# Patient Record
Sex: MALE | Race: WHITE | Employment: FULL TIME | URBAN - METROPOLITAN AREA
[De-identification: names, ages, dates, MRNs, and addresses within clinical notes are randomized per-mention and may not be internally consistent; named-entity substitution may affect disease eponyms.]

---

## 2017-06-13 ENCOUNTER — HOSPITAL ENCOUNTER (OUTPATIENT)
Dept: GENERAL RADIOLOGY | Age: 52
Discharge: HOME | End: 2017-06-13
Payer: COMMERCIAL

## 2017-06-13 DIAGNOSIS — S23.29XA: ICD-10-CM

## 2017-06-13 PROCEDURE — 71120 X-RAY EXAM BREASTBONE 2/>VWS: CPT

## 2023-07-07 ENCOUNTER — HOSPITAL ENCOUNTER (OUTPATIENT)
Facility: HOSPITAL | Age: 58
Discharge: HOME | End: 2023-07-07
Payer: COMMERCIAL

## 2023-07-07 DIAGNOSIS — M54.50 LOW BACK PAIN, UNSPECIFIED BACK PAIN LATERALITY, UNSPECIFIED CHRONICITY, UNSPECIFIED WHETHER SCIATICA PRESENT: ICD-10-CM

## 2023-07-07 PROCEDURE — 72110 X-RAY EXAM L-2 SPINE 4/>VWS: CPT

## 2023-12-04 ENCOUNTER — HOSPITAL ENCOUNTER (OUTPATIENT)
Facility: HOSPITAL | Age: 58
Setting detail: SPECIMEN
Discharge: HOME | End: 2023-12-07

## 2023-12-04 LAB
ANION GAP SERPL CALC-SCNC: 7 MMOL/L (ref 3–18)
BUN SERPL-MCNC: 18 MG/DL (ref 7–18)
BUN/CREAT SERPL: 17 (ref 12–20)
CALCIUM SERPL-MCNC: 9.5 MG/DL (ref 8.5–10.1)
CHLORIDE SERPL-SCNC: 106 MMOL/L (ref 100–111)
CO2 SERPL-SCNC: 26 MMOL/L (ref 21–32)
CREAT SERPL-MCNC: 1.06 MG/DL (ref 0.6–1.3)
GLUCOSE SERPL-MCNC: 75 MG/DL (ref 74–99)
POTASSIUM SERPL-SCNC: 4.9 MMOL/L (ref 3.5–5.5)
SODIUM SERPL-SCNC: 139 MMOL/L (ref 136–145)

## 2023-12-04 PROCEDURE — 80048 BASIC METABOLIC PNL TOTAL CA: CPT

## 2025-04-01 RX ORDER — SACUBITRIL AND VALSARTAN 49; 51 MG/1; MG/1
1 TABLET, FILM COATED ORAL 2 TIMES DAILY
COMMUNITY
Start: 2024-11-01

## 2025-04-01 RX ORDER — ATORVASTATIN CALCIUM 80 MG/1
1 TABLET, FILM COATED ORAL
COMMUNITY
Start: 2024-07-09

## 2025-04-01 RX ORDER — ASPIRIN 81 MG/1
81 TABLET, CHEWABLE ORAL DAILY
COMMUNITY
Start: 2024-09-05

## 2025-04-01 RX ORDER — DAPAGLIFLOZIN 10 MG/1
10 TABLET, FILM COATED ORAL DAILY
COMMUNITY
Start: 2024-11-01

## 2025-04-01 NOTE — PROGRESS NOTES
Instructions for your procedure at [facility]      Today's Date: 4/1/2025      Patient's Name: Robert Tellez      Procedure Date: 4/14/2025        Please enter the main entrance of the hospital and check-in at the  located in the lobby.      Do NOT eat or drink anything, including candy, gum, or ice chips after midnight prior to your procedure, unless it is part of your prep.  Brush your teeth before coming to the hospital.You may swish with water, but do not swallow.  No smoking/Vaping/E-Cigarettes 24 hours prior to the day of procedure.  No alcohol 24 hours prior to the day of procedure.  No recreational drugs for one week prior to the day of procedure.  Bring Photo ID, Insurance information, and Co-pay if required on day of procedure.  Bring in pertinent legal documents, such as, Medical Power of , DNR, Advance Directive, etc.  Leave all other valuables, including money/purse, weapons at home.  Remove jewelry, including ALL body piercings, nail polish, acrylic nails, and makeup (including mascara); no lotions, powders, deodorant, and/or perfume/cologne/after shave on the skin.  Glasses and dentures may be worn to the hospital.  They must be removed prior to procedure. Please bring case/container for glasses or dentures.  11. Contacts should not be worn on day of procedure.   12. Call the office ([phone]) if you have symptoms of a cold or illness within 24-48 hours prior to your procedure.   13. AN ADULT (relative or friend 18 years or older) MUST DRIVE YOU HOME AFTER YOUR PROCEDURE.   14. Please make arrangements for a responsible adult (18 years or older) to be with you for 24 hours after your procedure.   15. TWO VISITORS will be allowed in the waiting area during your procedure.       Special Instructions:      Bring list of CURRENT medications.  Follow instructions from the office regarding Bowel Prep, Vitamins, Iron, Blood Thinners, Insulin, Seizure, and

## 2025-04-11 ENCOUNTER — ANESTHESIA EVENT (OUTPATIENT)
Facility: HOSPITAL | Age: 60
End: 2025-04-11
Payer: COMMERCIAL

## 2025-04-14 ENCOUNTER — HOSPITAL ENCOUNTER (OUTPATIENT)
Facility: HOSPITAL | Age: 60
Setting detail: OUTPATIENT SURGERY
Discharge: HOME | End: 2025-04-14
Attending: INTERNAL MEDICINE | Admitting: INTERNAL MEDICINE
Payer: COMMERCIAL

## 2025-04-14 ENCOUNTER — ANESTHESIA (OUTPATIENT)
Facility: HOSPITAL | Age: 60
End: 2025-04-14
Payer: COMMERCIAL

## 2025-04-14 VITALS
WEIGHT: 201 LBS | OXYGEN SATURATION: 96 % | TEMPERATURE: 97.6 F | DIASTOLIC BLOOD PRESSURE: 60 MMHG | RESPIRATION RATE: 18 BRPM | BODY MASS INDEX: 28.05 KG/M2 | HEART RATE: 51 BPM | SYSTOLIC BLOOD PRESSURE: 99 MMHG

## 2025-04-14 PROCEDURE — 2709999900 HC NON-CHARGEABLE SUPPLY: Performed by: INTERNAL MEDICINE

## 2025-04-14 PROCEDURE — 3600007512: Performed by: INTERNAL MEDICINE

## 2025-04-14 PROCEDURE — 3600007502: Performed by: INTERNAL MEDICINE

## 2025-04-14 PROCEDURE — 2580000003 HC RX 258: Performed by: NURSE ANESTHETIST, CERTIFIED REGISTERED

## 2025-04-14 PROCEDURE — 88305 TISSUE EXAM BY PATHOLOGIST: CPT

## 2025-04-14 PROCEDURE — 3700000000 HC ANESTHESIA ATTENDED CARE: Performed by: INTERNAL MEDICINE

## 2025-04-14 PROCEDURE — 6360000002 HC RX W HCPCS: Performed by: ANESTHESIOLOGY

## 2025-04-14 PROCEDURE — 7100000010 HC PHASE II RECOVERY - FIRST 15 MIN: Performed by: INTERNAL MEDICINE

## 2025-04-14 PROCEDURE — 7100000000 HC PACU RECOVERY - FIRST 15 MIN: Performed by: INTERNAL MEDICINE

## 2025-04-14 PROCEDURE — 3700000001 HC ADD 15 MINUTES (ANESTHESIA): Performed by: INTERNAL MEDICINE

## 2025-04-14 RX ORDER — PROPOFOL 10 MG/ML
INJECTION, EMULSION INTRAVENOUS
Status: DISCONTINUED | OUTPATIENT
Start: 2025-04-14 | End: 2025-04-14 | Stop reason: SDUPTHER

## 2025-04-14 RX ORDER — SODIUM CHLORIDE, SODIUM LACTATE, POTASSIUM CHLORIDE, CALCIUM CHLORIDE 600; 310; 30; 20 MG/100ML; MG/100ML; MG/100ML; MG/100ML
INJECTION, SOLUTION INTRAVENOUS CONTINUOUS
Status: DISCONTINUED | OUTPATIENT
Start: 2025-04-14 | End: 2025-04-14 | Stop reason: HOSPADM

## 2025-04-14 RX ORDER — FAMOTIDINE 20 MG/1
20 TABLET, FILM COATED ORAL ONCE
Status: DISCONTINUED | OUTPATIENT
Start: 2025-04-14 | End: 2025-04-14 | Stop reason: HOSPADM

## 2025-04-14 RX ORDER — LIDOCAINE HYDROCHLORIDE 10 MG/ML
1 INJECTION, SOLUTION EPIDURAL; INFILTRATION; INTRACAUDAL; PERINEURAL
Status: DISCONTINUED | OUTPATIENT
Start: 2025-04-14 | End: 2025-04-14 | Stop reason: HOSPADM

## 2025-04-14 RX ADMIN — PROPOFOL 100 MG: 10 INJECTION, EMULSION INTRAVENOUS at 09:33

## 2025-04-14 RX ADMIN — PROPOFOL 30 MG: 10 INJECTION, EMULSION INTRAVENOUS at 09:52

## 2025-04-14 RX ADMIN — PROPOFOL 30 MG: 10 INJECTION, EMULSION INTRAVENOUS at 09:41

## 2025-04-14 RX ADMIN — PROPOFOL 30 MG: 10 INJECTION, EMULSION INTRAVENOUS at 09:55

## 2025-04-14 RX ADMIN — PROPOFOL 30 MG: 10 INJECTION, EMULSION INTRAVENOUS at 09:35

## 2025-04-14 RX ADMIN — PROPOFOL 30 MG: 10 INJECTION, EMULSION INTRAVENOUS at 09:53

## 2025-04-14 RX ADMIN — SODIUM CHLORIDE, SODIUM LACTATE, POTASSIUM CHLORIDE, AND CALCIUM CHLORIDE: 600; 310; 30; 20 INJECTION, SOLUTION INTRAVENOUS at 09:20

## 2025-04-14 RX ADMIN — PROPOFOL 30 MG: 10 INJECTION, EMULSION INTRAVENOUS at 09:47

## 2025-04-14 RX ADMIN — PROPOFOL 30 MG: 10 INJECTION, EMULSION INTRAVENOUS at 09:38

## 2025-04-14 RX ADMIN — PROPOFOL 30 MG: 10 INJECTION, EMULSION INTRAVENOUS at 09:44

## 2025-04-14 RX ADMIN — PROPOFOL 30 MG: 10 INJECTION, EMULSION INTRAVENOUS at 09:50

## 2025-04-14 ASSESSMENT — PAIN - FUNCTIONAL ASSESSMENT
PAIN_FUNCTIONAL_ASSESSMENT: NONE - DENIES PAIN
PAIN_FUNCTIONAL_ASSESSMENT: 0-10

## 2025-04-14 NOTE — H&P
[email or web address]  [phone]      History and Physical    Patient: Robert Tellez MRN: [id number]  SSN: [id number]    YOB: 1965  Age: 59 y.o.  Sex: male      Subjective:      Robert Tellez is a 59 y.o. male who presents for increased risk CRCS. Pt has a personal history of colon polyps. Denies symptoms or concerns. .     Past Medical History:   Diagnosis Date    CAD (coronary artery disease) 09/2023    MI-stented LAD    Hyperlipidemia     Hypertension     DONALD (obstructive sleep apnea)     uses mouth guard     Past Surgical History:   Procedure Laterality Date    CARDIAC CATHETERIZATION  09/2023    COLONOSCOPY        History reviewed. No pertinent family history.  Social History     Tobacco Use    Smoking status: Former     Types: Cigarettes    Smokeless tobacco: Never   Substance Use Topics    Alcohol use: Yes     Alcohol/week: 12.0 standard drinks of alcohol     Types: 12 Cans of beer per week      Prior to Admission medications    Medication Sig Start Date End Date Taking? Authorizing Provider   aspirin 81 MG chewable tablet Take 1 tablet by mouth daily 9/5/24  Yes ProviderMunira MD   atorvastatin (LIPITOR) 80 MG tablet Take 1 tablet by mouth nightly 7/9/24  Yes ProviderMunira MD   dapagliflozin (FARXIGA) 10 MG tablet Take 1 tablet by mouth daily 11/1/24  Yes ProviderMunira MD   sacubitril-valsartan (ENTRESTO) 49-51 MG per tablet Take 1 tablet by mouth 2 times daily 11/1/24  Yes Provider, MD Munira   cyanocobalamin 100 MCG tablet Take 1 tablet by mouth daily    ProviderMunira MD        Allergies   Allergen Reactions    Captopril Anaphylaxis     Had airway compromise, either Captopril or Albumin in hospital, uncertain which caused it       Review of Systems:  A comprehensive review of systems was negative except for that written in the History of Present Illness.    Objective:     There were no vitals filed for this visit.     Physical Exam:  GENERAL: alert,

## 2025-04-14 NOTE — ANESTHESIA PRE PROCEDURE
Department of Anesthesiology  Preprocedure Note       Name:  Robert Tellez   Age:  59 y.o.  :  1965                                          MRN:  [id number]         Date:  2025      Surgeon: Surgeon(s):  Ermelinda Dominguez MD    Procedure: Procedure(s):  COLONOSCOPY    Medications prior to admission:   Prior to Admission medications    Medication Sig Start Date End Date Taking? Authorizing Provider   aspirin 81 MG chewable tablet Take 1 tablet by mouth daily 24  Yes Provider, MD Munira   atorvastatin (LIPITOR) 80 MG tablet Take 1 tablet by mouth nightly 24  Yes Provider, MD Munira   dapagliflozin (FARXIGA) 10 MG tablet Take 1 tablet by mouth daily 24  Yes Provider, MD Munira   sacubitril-valsartan (ENTRESTO) 49-51 MG per tablet Take 1 tablet by mouth 2 times daily 24  Yes Provider, MD Munira   cyanocobalamin 100 MCG tablet Take 1 tablet by mouth daily   Yes Provider, MD Munira       Current medications:    Current Facility-Administered Medications   Medication Dose Route Frequency Provider Last Rate Last Admin   • lidocaine PF 1 % injection 1 mL  1 mL IntraDERmal Once PRN Vijaya Craft APRN - CRNA       • famotidine (PEPCID) tablet 20 mg  20 mg Oral Once Vijaya Craft APRN - CRNA       • lactated ringers infusion   IntraVENous Continuous Vijaya Craft APRN - CRNA 125 mL/hr at 25 0920 New Bag at 25 0920       Allergies:    Allergies   Allergen Reactions   • Captopril Anaphylaxis     Had airway compromise, either Captopril or Albumin in hospital, uncertain which caused it       Problem List:  There is no problem list on file for this patient.      Past Medical History:        Diagnosis Date   • CAD (coronary artery disease) 2023    MI-stented LAD   • Hyperlipidemia    • Hypertension    • DONALD (obstructive sleep apnea)     uses mouth guard       Past Surgical History:        Procedure Laterality Date   • CARDIAC CATHETERIZATION

## 2025-04-14 NOTE — DISCHARGE INSTRUCTIONS
Colonoscopy: What to Expect at Home  Your Recovery  After a colonoscopy, you'll stay at the clinic until you wake up. Then you can go home. But you'll need to arrange for a ride. Your doctor will tell you when you can eat and do your other usual activities.  Your doctor will talk to you about when you'll need your next colonoscopy. Your doctor can help you decide how often you need to be checked. This will depend on the results of your test and your risk for colorectal cancer.  After the test, you may be bloated or have gas pains. You may need to pass gas. If a biopsy was done or a polyp was removed, you may have streaks of blood in your stool (feces) for a few days. Problems such as heavy rectal bleeding may not occur until several weeks after the test. This isn't common. But it can happen after polyps are removed.  This care sheet gives you a general idea about how long it will take for you to recover. But each person recovers at a different pace. Follow the steps below to get better as quickly as possible.  How can you care for yourself at home?  Activity    Rest when you feel tired.     You can do your normal activities when it feels okay to do so.   Diet    Follow your doctor's directions for eating.     Unless your doctor has told you not to, drink plenty of fluids. This helps to replace the fluids that were lost during the colon prep.     Do not drink alcohol.   Medicines    Your doctor will tell you if and when you can restart your medicines. You will also be given instructions about taking any new medicines.     If you take aspirin or some other blood thinner, ask your doctor if and when to start taking it again. Make sure that you understand exactly what your doctor wants you to do.     If polyps were removed or a biopsy was done during the test, your doctor may tell you not to take aspirin or other anti-inflammatory medicines for a few days. These include ibuprofen (Advil, Motrin) and naproxen (Aleve).

## 2025-04-14 NOTE — ANESTHESIA POSTPROCEDURE EVALUATION
Department of Anesthesiology  Postprocedure Note    Patient: Robert Tellez  MRN: [id number]  YOB: 1965  Date of evaluation: 4/14/2025    Procedure Summary       Date: 04/14/25 Room / Location: [facility] ENDO 02 / [facility] ENDOSCOPY    Anesthesia Start: 0928 Anesthesia Stop: 1003    Procedure: COLONOSCOPY w/ polypectomies and bxs (Abdomen) Diagnosis:       Hx of colonic polyps      Colitis      (Hx of colonic polyps [Z86.0100])      (Colitis [K52.9])    Surgeons: Ermelinda Dominguez MD Responsible Provider: Douglas Hutchinson MD    Anesthesia Type: MAC ASA Status: 3            Anesthesia Type: MAC    Paloma Phase I: Paloma Score: 10    Paloma Phase II: Paloma Score: 10    Anesthesia Post Evaluation    Patient location during evaluation: bedside  Patient participation: complete - patient participated  Level of consciousness: responsive to verbal stimuli  Airway patency: patent  Nausea & Vomiting: no nausea  Respiratory status: acceptable  Hydration status: euvolemic    No notable events documented.

## (undated) DEVICE — YANKAUER,SMOOTH HANDLE,HIGH CAPACITY: Brand: MEDLINE INDUSTRIES, INC.

## (undated) DEVICE — SYRINGE MED 50ML LUERSLIP TIP

## (undated) DEVICE — GOWN PLASTIC FILM THMBHKS UNIV BLUE: Brand: CARDINAL HEALTH

## (undated) DEVICE — SYRINGE 20ML LL S/C 50

## (undated) DEVICE — TRAP SPEC POLYP REM STRNR CLN DSGN MAGNIFYING WIND DISP

## (undated) DEVICE — UNDERPAD INCONT W23XL36IN STD BLU POLYPR BK FLUF SFT

## (undated) DEVICE — SYRINGE MED 10ML LUERLOCK TIP W/O SFTY DISP

## (undated) DEVICE — SNARE POLYP M W27MMXL240CM OVL STIFF DISP CAPTIVATOR

## (undated) DEVICE — SYRINGE MED 25GA 3ML L5/8IN SUBQ PLAS W/ DETACH NDL SFTY

## (undated) DEVICE — SNARE VASC L240CM LOOP W10MM SHTH DIA2.4MM RND STIFF CLD

## (undated) DEVICE — GAUZE,SPONGE,4"X4",16PLY,STRL,LF,10/TRAY: Brand: MEDLINE

## (undated) DEVICE — LINER SUCT CANSTR 3000CC PLAS SFT PRE ASSEMB W/OUT TBNG W/

## (undated) DEVICE — CANNULA NSL AD TBNG L14FT STD PVC O2 CRV CONN NONFLARED NSL

## (undated) DEVICE — FORCEPS BX L240CM JAW DIA2.8MM L CAP W/ NDL MIC MESH TOOTH

## (undated) DEVICE — MEDI-VAC NON-CONDUCTIVE SUCTION TUBING: Brand: CARDINAL HEALTH

## (undated) DEVICE — ENDOSCOPY PUMP TUBING/ CAP SET: Brand: ERBE

## (undated) DEVICE — CANNULA ORIG TL CLR W FOAM CUSHIONS AND 14FT SUPL TB 3 CHN

## (undated) DEVICE — CATHETER SUCT TR FL TIP 14FR W/ O CTRL

## (undated) DEVICE — SOLUTION IRRIG 1000ML H2O STRL BLT